# Patient Record
Sex: FEMALE | Race: NATIVE HAWAIIAN OR OTHER PACIFIC ISLANDER
[De-identification: names, ages, dates, MRNs, and addresses within clinical notes are randomized per-mention and may not be internally consistent; named-entity substitution may affect disease eponyms.]

---

## 2019-08-08 ENCOUNTER — HOSPITAL ENCOUNTER (INPATIENT)
Dept: HOSPITAL 12 - ER | Age: 34
LOS: 1 days | Discharge: HOME | DRG: 101 | End: 2019-08-09
Payer: COMMERCIAL

## 2019-08-08 VITALS — WEIGHT: 150 LBS | BODY MASS INDEX: 26.58 KG/M2 | HEIGHT: 63 IN

## 2019-08-08 VITALS — SYSTOLIC BLOOD PRESSURE: 127 MMHG | DIASTOLIC BLOOD PRESSURE: 85 MMHG

## 2019-08-08 VITALS — DIASTOLIC BLOOD PRESSURE: 82 MMHG | SYSTOLIC BLOOD PRESSURE: 125 MMHG

## 2019-08-08 DIAGNOSIS — E83.51: ICD-10-CM

## 2019-08-08 DIAGNOSIS — I45.81: ICD-10-CM

## 2019-08-08 DIAGNOSIS — R56.9: Primary | ICD-10-CM

## 2019-08-08 DIAGNOSIS — T43.295A: ICD-10-CM

## 2019-08-08 DIAGNOSIS — F32.9: ICD-10-CM

## 2019-08-08 DIAGNOSIS — T43.595A: ICD-10-CM
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
## 2019-08-08 DIAGNOSIS — E87.2: ICD-10-CM

## 2019-08-08 DIAGNOSIS — G47.00: ICD-10-CM

## 2019-08-08 DIAGNOSIS — E78.00: ICD-10-CM

## 2019-08-08 LAB
ALP SERPL-CCNC: 48 U/L (ref 50–136)
ALT SERPL W/O P-5'-P-CCNC: 26 U/L (ref 14–59)
AMPHETAMINES UR QL SCN>1000 NG/ML: POSITIVE
AST SERPL-CCNC: 17 U/L (ref 15–37)
BARBITURATES UR QL SCN: NEGATIVE
BASOPHILS # BLD AUTO: 0.1 K/UL (ref 0–8)
BASOPHILS NFR BLD AUTO: 1.7 % (ref 0–2)
BILIRUB DIRECT SERPL-MCNC: 0.1 MG/DL (ref 0–0.2)
BILIRUB SERPL-MCNC: 0.4 MG/DL (ref 0.2–1)
BUN SERPL-MCNC: 8 MG/DL (ref 7–18)
CHLORIDE SERPL-SCNC: 103 MMOL/L (ref 98–107)
CO2 SERPL-SCNC: 25 MMOL/L (ref 21–32)
COCAINE UR QL SCN: NEGATIVE
CREAT SERPL-MCNC: 1 MG/DL (ref 0.6–1.3)
EOSINOPHIL # BLD AUTO: 0.1 K/UL (ref 0–0.7)
EOSINOPHIL NFR BLD AUTO: 1.6 % (ref 0–7)
ETHANOL SERPL-MCNC: < 3 MG/DL (ref 0–0)
GLUCOSE SERPL-MCNC: 112 MG/DL (ref 74–106)
HCG UR QL: NEGATIVE
HCT VFR BLD AUTO: 37.8 % (ref 31.2–41.9)
HGB BLD-MCNC: 12.8 G/DL (ref 10.9–14.3)
LYMPHOCYTES # BLD AUTO: 1.1 K/UL (ref 20–40)
LYMPHOCYTES NFR BLD AUTO: 20.6 % (ref 20.5–51.5)
MCH RBC QN AUTO: 30.2 UUG (ref 24.7–32.8)
MCHC RBC AUTO-ENTMCNC: 34 G/DL (ref 32.3–35.6)
MCV RBC AUTO: 89.2 FL (ref 75.5–95.3)
MONOCYTES # BLD AUTO: 0.4 K/UL (ref 2–10)
MONOCYTES NFR BLD AUTO: 7 % (ref 0–11)
NEUTROPHILS # BLD AUTO: 3.7 K/UL (ref 1.8–8.9)
NEUTROPHILS NFR BLD AUTO: 69.1 % (ref 38.5–71.5)
OPIATES UR QL SCN: NEGATIVE
PCP UR QL SCN>25 NG/ML: NEGATIVE
PLATELET # BLD AUTO: 308 K/UL (ref 179–408)
POTASSIUM SERPL-SCNC: 3.8 MMOL/L (ref 3.5–5.1)
RBC # BLD AUTO: 4.24 MIL/UL (ref 3.63–4.92)
THC UR QL SCN>50 NG/ML: NEGATIVE
WBC # BLD AUTO: 5.4 K/UL (ref 3.8–11.8)
WS STN SPEC: 7.1 G/DL (ref 6.4–8.2)

## 2019-08-08 PROCEDURE — G0480 DRUG TEST DEF 1-7 CLASSES: HCPCS

## 2019-08-08 PROCEDURE — G0378 HOSPITAL OBSERVATION PER HR: HCPCS

## 2019-08-08 PROCEDURE — A4663 DIALYSIS BLOOD PRESSURE CUFF: HCPCS

## 2019-08-08 RX ADMIN — MAGNESIUM SULFATE IN DEXTROSE SCH MLS/HR: 10 INJECTION, SOLUTION INTRAVENOUS at 14:34

## 2019-08-08 RX ADMIN — MAGNESIUM SULFATE IN DEXTROSE SCH MLS/HR: 10 INJECTION, SOLUTION INTRAVENOUS at 15:26

## 2019-08-08 NOTE — NUR
Patient requested for a sleeping pill, n.o from Dr. Cramer for Restoril 30mg PO QHS PRN, 
noted and carried out

## 2019-08-08 NOTE — NUR
Received patient in bed awake, A&Ox4. Not in distress at this time. Seizure precautions 
observed. Bed kept in low and locked position w/ padded side rails up x 2. Call light within 
reach. Will continue to monitor

## 2019-08-08 NOTE — NUR
34 year old female received from er via gurney to room 304 ,pt is axox4.call light with in 
reach ,vs are stable .md called for admission orders

## 2019-08-08 NOTE — NUR
PT IS A/OX4, BIB [facility] FROM WORK, C/O SEIZURE ACTIVITY. PER PARAMEDIC'S REPORT, 
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 PARAMEDIC'S WERE TOLD 
OF SEIZURE-LIKE ACTIVITY ON-SCENE. NO HX OF SEIZURE. PT PRESENTS LETHARGIC AND 
C/O NAUSEA W/ 1 EPISODE OF VOMITING. VSS, SINUS TACHY ON MONITOR. 20G IV ACCESS 
ESTABLISHED IN [facility] BY [facility].

## 2019-08-09 VITALS — DIASTOLIC BLOOD PRESSURE: 83 MMHG | SYSTOLIC BLOOD PRESSURE: 128 MMHG

## 2019-08-09 VITALS — DIASTOLIC BLOOD PRESSURE: 77 MMHG | SYSTOLIC BLOOD PRESSURE: 112 MMHG

## 2019-08-09 LAB
ALP SERPL-CCNC: 43 U/L (ref 50–136)
ALT SERPL W/O P-5'-P-CCNC: 24 U/L (ref 14–59)
AST SERPL-CCNC: 18 U/L (ref 15–37)
BASOPHILS # BLD AUTO: 0 K/UL (ref 0–8)
BASOPHILS NFR BLD AUTO: 1 % (ref 0–2)
BILIRUB SERPL-MCNC: 0.6 MG/DL (ref 0.2–1)
BUN SERPL-MCNC: 6 MG/DL (ref 7–18)
CHLORIDE SERPL-SCNC: 106 MMOL/L (ref 98–107)
CHOLEST SERPL-MCNC: 225 MG/DL (ref ?–200)
CO2 SERPL-SCNC: 25 MMOL/L (ref 21–32)
CREAT SERPL-MCNC: 0.8 MG/DL (ref 0.6–1.3)
EOSINOPHIL # BLD AUTO: 0.1 K/UL (ref 0–0.7)
EOSINOPHIL NFR BLD AUTO: 1.4 % (ref 0–7)
GLUCOSE SERPL-MCNC: 93 MG/DL (ref 74–106)
HCT VFR BLD AUTO: 34.8 % (ref 31.2–41.9)
HDLC SERPL-MCNC: 81 MG/DL (ref 40–60)
HGB BLD-MCNC: 12.1 G/DL (ref 10.9–14.3)
LYMPHOCYTES # BLD AUTO: 0.7 K/UL (ref 20–40)
LYMPHOCYTES NFR BLD AUTO: 18 % (ref 20.5–51.5)
MAGNESIUM SERPL-MCNC: 2.4 MG/DL (ref 1.8–2.4)
MCH RBC QN AUTO: 30.7 UUG (ref 24.7–32.8)
MCHC RBC AUTO-ENTMCNC: 35 G/DL (ref 32.3–35.6)
MCV RBC AUTO: 88.2 FL (ref 75.5–95.3)
MONOCYTES # BLD AUTO: 0.3 K/UL (ref 2–10)
MONOCYTES NFR BLD AUTO: 6.1 % (ref 0–11)
NEUTROPHILS # BLD AUTO: 3.1 K/UL (ref 1.8–8.9)
NEUTROPHILS NFR BLD AUTO: 73.5 % (ref 38.5–71.5)
PHOSPHATE SERPL-MCNC: 2.5 MG/DL (ref 2.5–4.9)
PLATELET # BLD AUTO: 260 K/UL (ref 179–408)
POTASSIUM SERPL-SCNC: 3.8 MMOL/L (ref 3.5–5.1)
RBC # BLD AUTO: 3.95 MIL/UL (ref 3.63–4.92)
TRIGL SERPL-MCNC: 97 MG/DL (ref 30–150)
TSH SERPL DL<=0.005 MIU/L-ACNC: 2.4 MIU/ML (ref 0.36–3.74)
WBC # BLD AUTO: 4.2 K/UL (ref 3.8–11.8)
WS STN SPEC: 6.5 G/DL (ref 6.4–8.2)

## 2019-08-09 NOTE — NUR
Received patient awake in bed. AAOx4. In no acute distress.  Denies any pain or discomfort 
at this time. Seizure precautions maintained. Comfort provided. Call light within reach. 
Will continue to monitor.

## 2019-08-09 NOTE — NUR
Patient slept for 3 hours. Not in distress at this time. All needs attended. Will endorse 
accordingly

## 2019-08-09 NOTE — NUR
Discharge orders in place. Vital signs stable. Denies any pain or discomfort. Exit care 
provided. Education given on continuity of care. IV and armband removed.